# Patient Record
Sex: MALE | Race: WHITE
[De-identification: names, ages, dates, MRNs, and addresses within clinical notes are randomized per-mention and may not be internally consistent; named-entity substitution may affect disease eponyms.]

---

## 2017-05-04 ENCOUNTER — HOSPITAL ENCOUNTER (EMERGENCY)
Dept: HOSPITAL 80 - CED | Age: 22
Discharge: LEFT BEFORE DISCHARGE | End: 2017-05-04
Payer: COMMERCIAL

## 2017-05-04 DIAGNOSIS — Z53.21: Primary | ICD-10-CM

## 2018-11-08 ENCOUNTER — HOSPITAL ENCOUNTER (INPATIENT)
Dept: HOSPITAL 80 - FED | Age: 23
LOS: 5 days | Discharge: HOME | DRG: 882 | End: 2018-11-13
Attending: PSYCHIATRY & NEUROLOGY | Admitting: PSYCHIATRY & NEUROLOGY
Payer: MEDICAID

## 2018-11-08 DIAGNOSIS — F43.25: Primary | ICD-10-CM

## 2018-11-08 DIAGNOSIS — F84.0: ICD-10-CM

## 2018-11-08 LAB — PLATELET # BLD: 209 10^3/UL (ref 150–400)

## 2018-11-08 PROCEDURE — G0480 DRUG TEST DEF 1-7 CLASSES: HCPCS

## 2018-11-08 NOTE — ASMTTLCEVL
[facility] Evaluation - Basic Information

 

Evaluation Start Date and     11/08/2018 06:30 PM

Time                          

Hospital Status               Answers:  M1 Hold                               

Patient statement             

Notes:

I got kicked out of a coffee shop.  Edel gotten verbally aggressive

Narrative                     

Notes:

Pt is a 23 year old  male who was brought to [facility] Ed after he got into an argument at a 

coffee shop and became verbally aggressive and agitated. Pts psychiatrist recommended pt come in to 


the emergency department for an evaluation. Per parents pt has increased agitation and insistence 

around going to this coffee shop even though he is no longer wanted there due to his aggressive 

behaviors. Today, pt became verbally aggressive to another customer and he vomited in and bathroom 

was kicked out of the coffee shop and pt became agitated. Pt stated,  I get angry when I feel Edel 

been wronged.This writer spoke with pt.s psychiatrist Dr. Mack. Dr. Mack stated, Pts mother 

called him and stated People  him to be unsafe, and think he is dangerous and cannot contain 

him.  Pt stated he was having SI yesterday he felt suicidal but no plan. Pt denies SI 

currently. Mom stated she does not feel safe taking pt home and stated, I know he will try and 

leave and go back to the coffee shop and because he is autistic, there will be a violent 

altercation.  Mom would like pt to be hospitalized.

Diagnosis History             

Notes:

Pt has a hx of high functioning autism. Parents state his psychiatrist believe pt is showing signs 

of paranoia and has a mood disorder. 

Prior suicide attempts        

Notes:

Pt denied any prior suicide attempts but stated he has had SI yesterday but no plan.

Prior hospitalizations        

Notes:

None reported

Treatment Responses           

Notes:

N/A

History of violence           

Notes:

Pt denied but father reported that pt told him just a few minutes prior to this evaluation that he 

wants to burn down the coffee house.  Pt admitted that he did say that but that he does not mean it 


and if that even if I did do it, I would not do it when people were inside, but Im not going to do 

that.

Therapist:                    Dr. Vivar

Psychiatrist:                 Dr. Mack

Medications                   

(name, dosage, route, freq    

uency)                        

Notes:

Abilify 20mg; Melatonin; Paroxetine 40mg

Benzodiazapene (Unsure what benzo)

Allergies/Reaction            

Notes:

Nka

Sleep                         

Notes:

Wnl

Appetite                      

Notes:

Wnl

Medical/Surgical history      

Notes:

Pt reports recent vomiting and fainting. He has an appt tomorrow with his pcp for a check up.

Substance use history         

(frequency, intensity, his    

tory, duration)               

Notes:

Pt denied any drug use and states he drinks socially. Utox positive for benzodiazepines and bal was 


.0.

Family composition            

Notes:

Pt has 1 older half-brother.

Need for family               Answers:  No                                    

participation in                                                              

patient's care                                                                

Family                        

psychiatric/substance         

abuse history                 

Notes:

Pts maternal grandmother was schizophrenic, mother has depression. Father has depression and pt.s 

paternal aunt was schizophrenic.

Developmental history         

Notes:

Pt was dx with autism at age 23. Pt reports he was bullied all through elementary, middle school 

and high school. Pt denied any childhood abuse. Pt denied any concussions. 

Abuse concerns                Answers:  None                                  

Marital status/children       

Notes:

Unmarried, no children.

Living situation              

Notes:

Pt Lives in [place] with his roommate in coffee shop. 

Sexual                        

history/orientation           

Notes:

Heterosexual

Peer support/family           

strengths                     

Notes:

Pt stated he has a good support system.

Education level/history       

Notes:

Pt completed some college.

Work history                  

Notes:

Pt stated he works at [employer].

                      

Notes:

None reported.

Legal                         

Notes:

Pt stated road rage and followed up and yelled at her and  gave him a court date.  There was 

another incident where pt liked a  at a coffee shop but she felt uncomfortable with his 

advances and the manager filed a restraining order against him and he will not be allowed to go 

back. 

Anglican/Spiritual           

Notes:

None that would interfere with tx.

Leisure                       

Notes:

Pt enjoys hikers and bikes. 

Collateral                    

Notes:

Parents

Dr. Mack

Patient's strengths           Answers:  Honest                                

(Please select at least                                                       

TWO strengths):                                                               

                                        Intelligent                           

                                        Willingness                           

TLC Evaluation - Mental Status Exam

 

Appearance:                   Answers:  Appropriate                           

Eye Contact:                  Answers:  Intermittent                          

Mood:                         Answers:  Irritable                             

                                        Sad                                   

Affect:                       Answers:  Calm                                  

                                        Guarded                               

Behavior:                     Answers:  Cooperative                           

                                        Resistive to Care                     

Speech:                       Answers:  Relevant                              

                                        Logical                               

                                        Clear                                 

                                        Coherent                              

Thought Process:              Answers:  Organized                             

                                        Oriented                              

                                        Alert                                 

Insight:                      Answers:  Fair                                  

Judgement:                    Answers:  Poor                                  

Manic Signs/Symptoms          Answers:  Mood Swings                           

Hallucinations:               Answers:  None                                  

Delusions:                    Answers:  Paranoid Ideation                     

Pt reported to have           Answers:  No                                    

suicidal/self-injuring                                                        

ideation/behavior?                                                            

Pt reported to be making      Answers:  Yes                                   

suicidal/self-injuring                                                        

threats?                                                                      

Pt reported to have           Answers:  No                                    

aggression/assault                                                            

ideation/behavior?                                                            

Pt reported to be making      Answers:  Yes                                   

aggression/assault                                                            

threats?                                                                      

Pt exhibits inability to      Answers:  No                                    

care for self/grave                                                           

disability?                                                                   

Ideation/behavior is          Answers:  Yes                                   

chronic?                                                                      

Patient has a specific        Answers:  No                                    

plan?                                                                         

History of                    Answers:  No                                    

suicidal/self-injuring                                                        

ideation, behavior, or                                                        

threats?                                                                      

History of                    Answers:  Yes                                   

aggressive/assaultive                                                         

ideation, behavior, or                                                        

threats?                                                                      

History of serious            Answers:  No                                    

physical harm to                                                              

self/others while in                                                          

treatment setting?                                                            

T[facility] Evaluation - Suicide/Homicide Risk

 

Suicide Risk Factors:         Answers:  < 20 or > 40 Years of Age             

                                        Agitation                             

                                        Rapid Mood Shifts                     

Homicide/violence risk        Answers:  Threats Towards Others                

factors:                                                                      

Current Suicidal              Answers:  No                                    

Ideation?                                                                     

Current Suicidal Ideation     Answers:  Yes                                   

in the Past 48 Hours?                                                         

Current Suicidal              Answers:  No                                    

Ideation, Worst Ever?                                                         

Suicide Internal              Answers:  Absence of Psychosis                  

Protective Factors:                                                           

Suicide External              Answers:  Positive Therapeutic                  

Protective Factors:                     Relationships                         

                                        Social Support                        

Ranking of patient's          Answers:  Moderate                              

suicidal risk:                                                                

Ranking of patient's          Answers:  Moderate                              

homicidal risk:                                                               

TLC Evaluation - Wrap-up

 

AXIS I Diagnosis (include     

DSM-V and ICD-10              

codes), must also be          

entered in                    

[facility], which is the        

source of truth.              

Notes:

Unspecified Schizophrenia Spectrum and Other Psychotic Disorder  298.9  (F29)

 Autism Spectrum Disorder  299.00 (F84.0)

 In consultation with [facility] ED physician, Last Mcgowan MD and on-call psychiatrist, Radha Larios MD, both concurred that pt appears to meet 27-65 criteria requiring psychiatric 

hospitalization as pt appears to be at risk of harm to self/others due to a mental illness 

condition

Evaluation End Date and       11/08/2018 10:10 PM

Time (HH:MOISES):                 

 

Date Signed:  11/08/2018 10:11 PM

Electronically Signed By:Lisa Cid

## 2018-11-08 NOTE — EDPHY
H & P


Smoking Status: Never smoked


Time Seen by Provider: 11/08/18 14:06


HPI/ROS: 





CHIEF COMPLAINT:  Suicidal ideation, increasing agitation





HISTORY OF PRESENT ILLNESS:  Patient had an argument in a coffee shop today 

with another person.  He has a history of autism and depression.  He was making 

suicidal statements to his parents and was increasingly agitated since this 

encounter and his mental health provider recommended to come to the emergency 

department for evaluation.


Here the patient says he has some suicidal thoughts but no plan.  Denies 

homicidal ideation or hallucinations.





REVIEW OF SYSTEMS:


Eye: no change in vision


ENT: no sore throat


Cardiac: no chest pain or syncope


Pulmonary: no cough or SOB


Abdomen:  Has some nausea and recent vomiting does not have abdominal pain.  No 

diarrhea.


Musculoskeletal: no back pain


Skin: no rash


Neuro: no headache


Constitutional: no fever


: no urinary symptoms





A comprehensive 10 point review of systems is otherwise negative aside from 

elements mentioned in the history of present illness.





PAST MEDICAL HISTORY:  Autism and depression





Social history:  Nonsmoker, no alcohol





General Appearance: Alert and conversant, cooperative.


Eyes: No scleral  icterus. 


ENT, Mouth: Normal mucous membranes.


Respiratory: Normal respiratory effort, breath sounds equal, lungs are clear to 

auscultation.


Cardiovascular:  Regular rate and rhythm.


Gastrointestinal:  Abdomen is soft and non tender.  No rebound or guarding.  No 

McBurney's point tenderness.


Neurological: Alert, face symmetric, normal motor and sensory in extremities. 


Skin: Warm and dry, no rashes.


Musculoskeletal: No peripheral edema.


Psychiatric: Not agitated.  Otherwise as in HPI.





Emergency Department course/MDM:





Labs and urine tox, psychiatric evaluation.


Signed out to [facility] at 1500 with psychiatric evaluation pending. (Charlie Schulte)


Constitutional: 


 Initial Vital Signs











Temperature (C)  37.5 C   11/08/18 14:05


 


Heart Rate  97   11/08/18 14:05


 


Respiratory Rate  16   11/08/18 14:05


 


Blood Pressure  134/82 H  11/08/18 14:05


 


O2 Sat (%)  95   11/08/18 14:05








 











O2 Delivery Mode               Room Air














Allergies/Adverse Reactions: 


 





No Known Allergies Allergy (Unverified 11/08/18 14:03)


 








Home Medications: 














 Medication  Instructions  Recorded


 


ARIPiprazole [Abilify 5 mg (*)] 5 mg PO HS 11/08/18


 


ARIPiprazole [Abilify] 15 mg PO HS 11/08/18


 


Melatonin [Melatonin 5 mg] 5 mg PO HS PRN 11/08/18


 


PARoxetine HCL [Paroxetine HCl] 40 mg PO HS 11/08/18














Medical Decision Making


ED Course/Re-evaluation: 





2133:  Patient been accepted at [address] [place] [address] by Dr. Larios.


EMTALA FILLED OUT>


APPROPRIATE TRANSFER WILL BE SET UP. (Last Joshi)





- Data Points


Laboratory Results: 


 Laboratory Results





 11/08/18 14:40 





 11/08/18 14:40 








Medications Given: 


 





Aripiprazole (Abilify)  20 mg PO HS ROMELIA


   Stop: 05/07/19 23:29


   Last Admin: 11/08/18 23:33 Dose:  20 mg


Lorazepam (Ativan)  0.5 - 1 mg PO Q4HRS PRN


   PRN Reason: Anxiety, Able to Take PO


   Stop: 05/07/19 22:59


   Last Admin: 11/08/18 23:33 Dose:  1 mg


Paroxetine HCl (Paxil)  40 mg PO HS ROMELIA


   Stop: 05/07/19 23:29


   Last Admin: 11/08/18 23:33 Dose:  40 mg





Discontinued Medications





Lorazepam (Ativan)  1 mg PO ONCE ONE


   Stop: 11/08/18 21:33


   Last Admin: 11/08/18 21:35 Dose:  1 mg








Departure





- Departure


Disposition: [facility] IP


Clinical Impression: 


 Severe major depression





Condition: Good

## 2018-11-08 NOTE — ASMTTCLDSP
TLC Discharge Disposition

 

Disposition:                  Answers:  Admit                                 

Discharge                     

Concerns/Recommendations:     

Notes:

In consultation with [facility] ED physician, Last Mcgowan MD and on-call psychiatrist, Radha Larios MD, both concurred that pt appears to meet 27-65 criteria requiring psychiatric 

hospitalization as pt appears to be at risk of harm to self/others due to a mental illness 

condition. Pt was given the [facility] prohibited belongings list while in the ED.

Was patient given the         Answers:  Yes                                   

Inpatient Behavioral                                                          

Health Prohibited                                                             

Belongings List while in                                                      

the ED?                                                                       

For inpatient                 Radha Larios MD

admission, the following      

psychiatrist agreed to        

accept patient for            

admission to Behavioral       

Health ([facility]):              

 

Date Signed:  11/08/2018 10:13 PM

Electronically Signed By:Lisa Cid
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
## 2018-11-09 NOTE — PDMN
Medical Necessity


Medical necessity: Pt meets inpt criteria per MD order and [facility] B-012, Autism 

Spectrum Disorders, Adult: Inpatient Care. 24 y/o admitted w/autism spectrum 

disorder and adjustment disorder w/mixed disturbance of emotions and conduct, 

on M1 hold due to risk of harm to self and others, requiring inpt psychiatric 

hospitalization.
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
## 2018-11-09 NOTE — PDGENHP
History and Physical





- Chief Complaint


medical evaluation 





- History of Present Illness


22 [facility] Has depression, autism spectrum disorder 

and per report increasing depression and angry outbursts, concerns about 

suicidal thoughts.


He denied recent illness, cough, congestion, v/d, abdominal pain. Does have 

frequent heartburn symptoms with nausea.  Also has occ frontal headaches, no 

history of migraines. 





History Information





- Allergies/Home Medication List


Allergies/Adverse Reactions: 








No Known Allergies Allergy (Unverified 11/08/18 14:03)


 





Home Medications: 








ARIPiprazole [Abilify 5 mg (*)] 5 mg PO HS 11/08/18 [Last Taken 11/07/18]


ARIPiprazole [Abilify] 15 mg PO HS 11/08/18 [Last Taken 11/07/18]


Melatonin [Melatonin 5 mg] 5 mg PO HS PRN 11/08/18 [Last Taken 11/07/18]


PARoxetine HCL [Paroxetine HCl] 40 mg PO HS 11/08/18 [Last Taken 11/07/18]





I have personally reviewed and updated: medical history, social history, 

surgical history


Past Medical History: depressin, autism spectrum/[place] (Dr Noel Mack, Dr Johnson, PCP Dr SADAF Regalado)





- Past Medical History


Additional medical history: no DM/HTN/asthma





- Surgical History


Reports: no pertinent surgical hx





- Family History


Positive for: non-pertinent





- Social History


Smoking Status: Never smoked


Alcohol Use: Rarely (2-3 beers a week)


Drug Use: None


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 

family in area





Review of Systems


Review of Systems: 








Physical Exam


Physical Exam: 

















Temp Pulse Resp BP Pulse Ox


 


 98.1 F   90   16   119/77   96 


 


 11/09/18 09:40  11/09/18 09:40  11/09/18 09:40  11/09/18 09:40  11/09/18 09:40











Constitutional: no apparent distress


Eyes: anicteric sclera


Ears, Nose, Mouth, Throat: moist mucous membranes, hearing normal


Cardiovascular: regular rate and rhythym, no murmur, rub, or gallop


Respiratory: no respiratory distress, no rales or rhonchi, clear to auscultation


Gastrointestinal: normoactive bowel sounds, soft, non-tender abdomen


Skin: warm, normal color


Psychiatric: depressed, flat affect


Lymph, Heme, Immunologic: no cervical LAD





Lab Data & Imaging Review





 11/08/18 14:40





 11/08/18 14:40














WBC  6.65 10^3/uL (3.80-9.50)   11/08/18  14:40    


 


RBC  5.32 10^6/uL (4.40-6.38)   11/08/18  14:40    


 


Hgb  15.6 g/dL (13.7-17.5)   11/08/18  14:40    


 


Hct  45.0 % (40.0-51.0)   11/08/18  14:40    


 


MCV  84.6 fL (81.5-99.8)   11/08/18  14:40    


 


MCH  29.3 pg (27.9-34.1)   11/08/18  14:40    


 


MCHC  34.7 g/dL (32.4-36.7)   11/08/18  14:40    


 


RDW  12.3 % (11.5-15.2)   11/08/18  14:40    


 


Plt Count  209 10^3/uL (150-400)   11/08/18  14:40    


 


MPV  10.3 fL (8.7-11.7)   11/08/18  14:40    


 


Neut % (Auto)  70.2 % (39.3-74.2)   11/08/18  14:40    


 


Lymph % (Auto)  21.5 % (15.0-45.0)   11/08/18  14:40    


 


Mono % (Auto)  6.6 % (4.5-13.0)   11/08/18  14:40    


 


Eos % (Auto)  0.8 % (0.6-7.6)   11/08/18  14:40    


 


Baso % (Auto)  0.6 % (0.3-1.7)   11/08/18  14:40    


 


Nucleat RBC Rel Count  0.0 % (0.0-0.2)   11/08/18  14:40    


 


Absolute Neuts (auto)  4.67 10^3/uL (1.70-6.50)   11/08/18  14:40    


 


Absolute Lymphs (auto)  1.43 10^3/uL (1.00-3.00)   11/08/18  14:40    


 


Absolute Monos (auto)  0.44 10^3/uL (0.30-0.80)   11/08/18  14:40    


 


Absolute Eos (auto)  0.05 10^3/uL (0.03-0.40)   11/08/18  14:40    


 


Absolute Basos (auto)  0.04 10^3/uL (0.02-0.10)   11/08/18  14:40    


 


Absolute Nucleated RBC  0.00 10^3/uL (0-0.01)   11/08/18  14:40    


 


Immature Gran %  0.3 % (0.0-1.1)   11/08/18  14:40    


 


Immature Gran #  0.02 10^3/uL (0.00-0.10)   11/08/18  14:40    


 


Sodium  140 mEq/L (135-145)   11/08/18  14:40    


 


Potassium  4.0 mEq/L (3.3-5.0)   11/08/18  14:40    


 


Chloride  105 mEq/L ()   11/08/18  14:40    


 


Carbon Dioxide  25 mEq/l (22-31)   11/08/18  14:40    


 


Anion Gap  10 mEq/L (6-14)   11/08/18  14:40    


 


BUN  14 mg/dL (7-23)   11/08/18  14:40    


 


Creatinine  0.8 mg/dL (0.7-1.3)   11/08/18  14:40    


 


Estimated GFR  > 60   11/08/18  14:40    


 


Glucose  104 mg/dL ()  H  11/08/18  14:40    


 


Hemoglobin A1c  5.7 % (4.0-6.0)   11/08/18  14:40    


 


Estim Average Glucose  117 mg/dL ()   11/08/18  14:40    


 


Calcium  10.0 mg/dL (8.5-10.4)   11/08/18  14:40    


 


Total Bilirubin  0.8 mg/dL (0.1-1.4)   11/08/18  14:40    


 


Conjugated Bilirubin  0.2 mg/dL (0.0-0.5)   11/08/18  14:40    


 


Unconjugated Bilirubin  0.6 mg/dL (0.0-1.1)   11/08/18  14:40    


 


AST  27 IU/L (17-59)   11/08/18  14:40    


 


ALT  32 IU/L (21-72)   11/08/18  14:40    


 


Alkaline Phosphatase  79 IU/L ()   11/08/18  14:40    


 


Total Protein  7.6 g/dL (6.3-8.2)   11/08/18  14:40    


 


Albumin  4.8 g/dL (3.5-5.0)   11/08/18  14:40    


 


Triglycerides  275 mg/dL ()  H  11/08/18  14:40    


 


Cholesterol  203 mg/dL (140-200)  H  11/08/18  14:40    


 


Cholesterol Risk Factr  1.0  (0.2-1.0)   11/08/18  14:40    


 


LDL Cholesterol, Calc  105 mg/dL ()  H  11/08/18  14:40    


 


LDL Risk Factor  0.8  (0.2-1.0)   11/08/18  14:40    


 


VLDL Cholesterol  55 mg/dL (8-25)  H  11/08/18  14:40    


 


Non-HDL Cholesterol  160 mg/dL ()  H  11/08/18  14:40    


 


HDL Cholesterol  43 mg/dL (40-70)   11/08/18  14:40    


 


LDL/HDL Ratio  2.44 RATIO (1.00-3.64)   11/08/18  14:40    


 


Cholesterol/HDL Ratio  4.72 RATIO (1.00-4.97)   11/08/18  14:40    


 


Salicylates  < 1.0 mg/dL (2.0-20.0)  L  11/08/18  14:40    


 


Urine Opiates Screen  NEGATIVE  (NEGATIVE)   11/08/18  16:22    


 


Acetaminophen  < 10 mcg/mL (10-30)  L  11/08/18  14:40    


 


Urine Barbiturates  NEGATIVE  (NEGATIVE)   11/08/18  16:22    


 


Ur Phencyclidine Scrn  NEGATIVE  (NEGATIVE)   11/08/18  16:22    


 


Ur Amphetamine Screen  NEGATIVE  (NEGATIVE)   11/08/18  16:22    


 


U Benzodiazepines Scrn  NON-NEGATIVE  (NEGATIVE)  H  11/08/18  16:22    


 


Urine Cocaine Screen  NEGATIVE  (NEGATIVE)   11/08/18  16:22    


 


U Marijuana (THC) Screen  NEGATIVE  (NEGATIVE)   11/08/18  16:22    


 


Ethyl Alcohol  < 10 mg/dL (0-10)   11/08/18  14:40    











Assessment & Plan


Assessment: 








Adjustment disorder with mixed disturbance of emotions and conduct (Acute)


Autism spectrum disorder (Chronic)


   -per  team





Heartburn/nausea


   -ordered pepcid daily and zofran to use as needed





Headache


   -prns ordered





Thank you for asking hospitalist to participate in his care and please contact 

us if further medical needs arise.

## 2018-11-10 RX ADMIN — FAMOTIDINE SCH MG: 20 TABLET, FILM COATED ORAL at 08:53
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
## 2018-11-11 RX ADMIN — FAMOTIDINE SCH MG: 20 TABLET, FILM COATED ORAL at 09:10

## 2018-11-11 NOTE — ASMTBHDC
Notes

 

Note:                         

Notes:

Pt. reports feeling "pretty good. Nice and cozy in here". Pt. reports sleeping "really 

well". Pt. stated he is "absolutly" getting enough to eat. Pt. reports no issues with his current 

medications. Pt. stated his therapist is Dr. Johnson, in [place] Pt. stated he is attending 

groups, and likes art group. Pt. stated "found everyone here to be very nice". Pt. shared he had 

several visitors yesterday. Pt. requested to shave. Pt. stated he hopes to discharge on Monday and 

then take Tuesday off from work. When asked if he could stay away from the coffee shop near his 

home, pt stated "try but don't know if I could do that". Pt. stated he doesn't have a 

car. Pt. stated he likes coffee shops. Pt. denied SI, HI, AVH, and paranoia. 



Pt. presents as alert, good eye contact, fidgety cooperative, and friendly. Staff report 

pt. sleeping 8 hours and being medication compliant. 

 

Date Signed:  11/11/2018 03:04 PM

Electronically Signed By:Tomasa Hernandez
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
## 2018-11-12 RX ADMIN — FAMOTIDINE SCH MG: 20 TABLET, FILM COATED ORAL at 08:05

## 2018-11-12 NOTE — ASMTBHDC
Notes

 

Note:                         

Notes:

Pt. reports feeling "pretty good". Pt. stated he slept "good". Pt. stated he is "very mad at coffee 


shop". Pt. stated he is upset they threw him out for getting sick. Pt. stated "no one's helping me 

convince the coffee shop lopez..." Pt. stated if he cannot go home, he could rent a hotel for a 

couple of nights, adding he has savings. Pt. stated he has over a $100 in savings. Pt. denied 

SI, HI, AVH and paranoia. 



Pt. presents as alert, slightly agitated, fair eye contact, fidgeting, and distracted. Staff report 


pt. sleeping 7.5 hours and being medication compliant. 

 

Date Signed:  11/12/2018 01:55 PM

Electronically Signed By:Tomasa Hernandez

## 2018-11-12 NOTE — SOAPPROG
SOAP Progress Note


Assessment/Plan: 


Assessment:





Major Depressive Disorder, Severe.  Autism Spectrum Disorder.  Improvement 

noted. (see subjective/objective note).  Patient requires continued inpatient 

hospitalization due to recent suicidal ideation.  Patient could benefit from 

continued inpatient hospitalization for crisis stabilization, safety, and 

medication evaluation.  Patient likely reaching baseline and discharge tomorrow 

if stable with safe discharge plan.  








Plan:





1. Psychotropic medications: After reviewing options, risks, and benefits 

patient agrees to continue current medications.  No other medication changes at 

this time as more time is needed to determine ongoing tolerability and 

efficacy.  Plan is to continue to observe patient for response and side effects 

from medications, and ongoing monitoring and evaluation.  


2. Review with patient informed consent and recommendations for psychotropic 

medication treatment listed below


3. Labs: no additional labs at this time


4. Therapy: continue milieu and group therapy  


5. Further investigation including gathering information from patients 

relatives and review of past case records to inform treatment plan.


6. Safety/Wellness plan and follow-up outpatient appointments to be established 

prior to discharge.  Next steps are for patient to meet with care manager to 

plan a safe discharge plan and establish outpatient services for ongoing 

treatment.  


7. Confer with inpatient treatment team regarding treatment plan.  


8. Psychosocial stressors addressed through care coordinator 


9. Legal status: voluntary 


10. Consider discharge on Tuesday if patient is in stable condition, safe, and 

has a safe discharge plan.   








PSYCHOTROPIC MEDICATION TREATMENT INFORMED CONSENT and RECOMMENDATIONS: Review 

nature of condition, diagnosis, and prognosis.  Review nature and purpose of 

psychotropic medication treatment.  Review type of psychotropic medications 

being ordered.  Review risk and benefits of psychotropic medication treatment.  

Review probable length of time patient will need to take medications.  Review 

risk and benefits of not undergoing psychotropic medication treatment.  Review 

alternative treatments to psychotropic medications.  Review psychotropic 

medications contraindications, drug-drug interactions, side effects, and 

importance of reporting any side effects to a psychiatric provider or nurse 

during inpatient hospitalization, and upon discharge to patients psychiatric 

outpatient provider, primary care provider, or other health care professional.  

Review importance of asking a nurse, psychiatric provider, or primary care 

provider any questions or problems concerning the psychotropic medications.  

Verify patient understands the information that has been provided, and 

understands, accepts, and agrees to psychotropic medications.  





Review patients safety plan and importance of patient to report to staff while 

hospitalized if patient is ever a danger to self/others, or unable to care for 

self, and upon discharge, the importance for patient to contact [facility] or 911, or go to the nearest emergency room, if patient is ever a 

danger to self/others, or unable to care for self.  Recommend that upon 

discharge patient establish medication management treatment with a psychiatric 

provider, establishes routine therapy appointments, and follow-up with primary 

care provider.  Verify patient understands and agrees to these recommendations.





11/12/18 08:45





Subjective: 


Following up with patient for evaluation of depression and safety.  Patient 

reports, "Doing good.  Going okay."  Patient expresses the following 

psychiatric symptoms none.  Patient denies SI/HI, and reports no SI since prior 

to his admission.  Patient denies HI prior to admission.  Patient reports 

taking medications as prescribed, and describes response to medications as 

good.  Patient does not report undesirable side effects from the medications, 

and agrees to continue current medications.  Patient describes getting 8 hours 

of sleep.  





Objective: 





 Vital Signs











Temp Pulse Resp BP Pulse Ox


 


 36.5 C   64   20   120/59 L  94 


 


 11/12/18 06:00  11/12/18 06:00  11/12/18 06:00  11/12/18 06:00  11/12/18 06:00








NURSING REPORT: Consulted with nursing for update on patients progress in 

treatment.  Nurses report patient is engaged in treatment, is attending groups, 

slept 8 hours, expresses the following psychiatric symptoms: moderate anxiety; 

exhibits the following psychiatric symptoms: anxiety; is eating all meals, is 

agreeable to medications and taking as prescribed with no report of side effects

, with no s/s of EPS/akathisia, and denies SI/HI, denies A/V hallucinations, 

and denies delusions.





MD REPORT FROM WEEKEND: Patient is doing well, no complaints, no SI.





MSE: The patient presents casually dressed and with good hygiene, and looks 

stated age.  Patient is sitting, posture is upright, and position is relaxed.  

Patient appears awake, alert, and responds appropriately and reasonably during 

interview.  Patient is engaged, relates well to interviewer, and emotional 

facial expression is appropriate to situation and changes appropriately with 

topic.  Patient is cooperative, makes comfortable eye contact, and movements 

are voluntary, deliberate, coordinated, and smooth and even with no 

inappropriate movements.  Patient makes laryngeal sounds effortlessly and 

shares conversation appropriately; pace of conversation is appropriate, and 

stream of talking is fluent; articulation is clear and understandable; word 

choice is effortless and appropriate for education level; completes sentences, 

occasionally pausing to think; rate and volume are appropriate for interview 

and setting.  Patient reports mood as anxious.  Patients affect is anxious, 

congruent with mood, and appropriate to speech and circumstances.  Patient has 

linear and logical thinking, with no loose associations, tangential thought, 

thought blocking, concrete thinking, or any other signs of formal thought 

disorder.  Patient denies suicidal and homicidal ideation, and denies 

hallucinations and delusions.  Patient appears to be a reliable historian with 

sound judgement and good insight into current condition.  Patient has no 

apparent dysfunction in recent or remote memory noted, and no evidence of gross 

cognitive dysfunction noted at any point during the interview.











- Time Spent With Patient


Time Spent With Patient: 


15 minutes, met with patient individually.








- Pending Discharge


Pending Discharge Within 24 Hours: No


Pending Discharge Within 48 Hours: No





ICD10 Worksheet


Patient Problems: 


 Problems











Problem Status Onset


 


Adjustment disorder with mixed disturbance of emotions and conduct Acute  


 


Autism spectrum disorder Chronic
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
## 2018-11-13 VITALS — DIASTOLIC BLOOD PRESSURE: 58 MMHG | SYSTOLIC BLOOD PRESSURE: 119 MMHG

## 2018-11-13 RX ADMIN — FAMOTIDINE SCH MG: 20 TABLET, FILM COATED ORAL at 08:58

## 2018-11-13 NOTE — BDS
REASON FOR ADMISSION:  From the ED note dated 11/08/2018, the patient had an 
argument at a coffee shop prior to presenting to the emergency department.  
Patient has a history of autism and depression.  Patient was making suicidal 
statements to his parents and was increasingly agitated since the encounter in 
the coffee shop, and his mental health provider recommended he go to the 
emergency department for evaluation.  Patient reported suicidal thoughts with 
no plan.  Patient denied homicidal ideation or hallucinations.  Patient was 
admitted involuntarily on an M1 hold due to being a danger to himself.  Patient 
was admitted for safety, crisis stabilization, and medication evaluation.



ADMITTING DIAGNOSES:  

1.  Adjustment disorder with mixed disturbance of emotions and conduct.  

2.  Autism spectrum disorder admission.



ADMISSION PHYSICAL EXAM:  Patient was seen for history and physical on 11/09/
2018, for medical clearance for inpatient psychiatric hospitalization.  Patient 
was medically cleared for inpatient psychiatric hospitalization and treatment.  
For further details, please refer to history and physical dated 11/09/2018.



ADMISSION LABORATORY DATA:  CBC from 11/08/2018, within normal limits.  BMP 
from 11/08/2018, within normal limits, except glucose is elevated at 104.  
Hemoglobin A1c from 11/08/2018, was 5.7 and within normal limits.  Liver 
function from 11/08/2018, within normal limits.  Lipid panel from 11/08/2018, 
within normal limits, except triglycerides were elevated at 175, cholesterol 
was elevated at 203, LDL cholesterol calculated was elevated at 105, VLDL 
cholesterol was elevated at 55, non-HDL cholesterol was elevated at 160.  
Toxicology screen from 11/08/2018, was non-negative for benzodiazepines, 
negative for all other substances screened, and negative for ethyl alcohol.



MAJOR PROCEDURES/TESTS:  None.



HOSPITAL COURSE:  The most prominent symptoms and behaviors while the patient 
was here were moderate anxiety and depression.  Treatment modalities utilized 
were milieu and group therapy.  Patient's outpatient medications were continued
, including Paxil 40 mg p.o. q.h.s. and Abilify 20 mg p.o. q.h.s.  these 
medications were tolerated with no report of side effects and with good 
response.  Patient has improved considerably with no signs of psychiatric 
symptoms and no psychiatric symptoms expressed at time of discharge.  Patient 
reports he has improved since admission, states to be in stable condition, 
feels safe to discharge, and he contracts for safety.  Patient's response to 
treatment was good.  There were no adverse or unexpected results of treatment.  
The patient was safe throughout his stay, active in treatment, attended and 
engaged in groups, and was appropriate with staff.  Patient met with the 
treatment team prior to discharge to assess readiness to discharge and review 
discharge plan.  The treatment team consensus is the patient is in stable 
condition, has a safe discharge plan, and is ready to discharge today.



CONDITION AT DISCHARGE:  Patient is in stable condition and is no longer a 
danger to self or others, and is not gravely disabled due to mental illness.  
Patient is no longer in need of inpatient level of care, and can be safely and 
effectively treated within the community.  The patients level of risk at time 
of discharge is low.  MSE: The patient is casually dressed and with good hygiene
, and looks stated age.  Patient is sitting, posture is upright, and position 
is relaxed.  Patient appears awake, alert, and responds appropriately and 
reasonably during interview.  Patient is engaged, relates well to interviewer, 
and emotional facial expression is appropriate to situation and changes 
appropriately with topic.  Patient is cooperative, makes comfortable eye contact
, and movements are voluntary, deliberate, coordinated, and smooth and even 
with no inappropriate movements.  Patient makes laryngeal sounds effortlessly 
and shares conversation appropriately; pace of conversation is appropriate, and 
stream of talking is fluent; articulation is clear and understandable; word 
choice is effortless and appropriate for education level; completes sentences, 
occasionally pausing to think; rate and volume are appropriate for interview 
and setting.  Patient reports mood as euthymic.  Patients affect is stable with 
full variable range, congruent with mood, and appropriate to speech and 
circumstances.  Patient has linear and logical thinking, with no loose 
associations, tangential thought, thought blocking, concrete thinking, or any 
other signs of formal thought disorder.  Patient denies suicidal and homicidal 
ideation, and denies hallucinations and delusions.  Patient appears to be a 
reliable historian with sound judgement and good insight into current 
condition.  Patient has no apparent dysfunction in recent or remote memory noted
, and no evidence of gross cognitive dysfunction noted at any point during the 
interview.



DISCHARGE DIAGNOSES:  

1.  Adjustment disorder with mixed disturbance of emotions and conduct.  

2.  Autism spectrum disorder admission.



CURRENT MEDICATIONS:  After reviewing options risks and benefits with the 
patient, patient agrees to continue:

1.  Abilify 20 mg p.o. q.h.s. 

2.  Paroxetine 40 mg p.o. q.h.s.  



Patient reports he has prescriptions for these medications and does not require 
any prescriptions at time of discharge.  The medications and dosage and dosages 
are reviewed with the patient at time of discharge to ensure accuracy and 
patient understanding.



DISPOSITION:  Patient left hospital independently and voluntarily with his 
mother and father after meeting with this NP and care coordinator for a family 
meeting.  Patient plans to return home with his parents.



FOLLOWUP:  Care coordinator reports the appropriate outpatient follow-up 
services have been established and outpatient appointments have been scheduled.
  The patient received written instructions with times and dates of outpatient 
follow-up appointments.  The following follow-up recommendations were provided 
to the patient at discharge: Continue psychotropic medications as prescribed 
and attend appointments as scheduled.  Report any side effects to a psychiatric 
outpatient provider, a primary care provider, or other health care 
professional.  Address any questions or problems concerning the psychotropic 
medications with a psychiatric outpatient provider, a primary care provider, or 
other health care professional.  Contact [facility] or [facility], or go 
to the nearest emergency room, if you are ever a danger to yourself/others, or 
unable to care for yourself.  As soon as possible, establish a routine 
medication management treatment with a psychiatric provider, establish routine 
therapy appointments, and follow-up with a primary care provider.  



LEGAL COURSE:  Patient was admitted on an M1 hold for involuntary inpatient 
psychiatric hospitalization.  Patient discharged today independently and 
voluntarily with his parents.



ATTITUDE AT TIME OF DISCHARGE:  Patient reports, "I go a lot of good 
information from the groups here that I can use when I have intrusive thoughts.
  I learned the most from the groups."  The patients attitude was positive at 
time of discharge, and patient reports looking forward to discharging today.  
The patient reports he feels safe to discharge, is no longer a danger to 
himself or others, is in stable condition, and contracts for safety.  Patient 
states he will continue medications as prescribed, and establish medication 
management treatment with an outpatient provider after discharge.  Patient 
reports he understands the information that has been provided to him, and he 
understands, accepts, and agrees to psychotropic medications.  Patient 
describes internal protective factors as the coping skills he has learned while 
hospitalized here, and he plans to continue to practice these coping skills 
after discharge.  Patient reports external protective factors as family.  



FAMILY MEETING: This NP and care coordinator met with patient's parents at time 
of discharge at patient's request to review discharge plan and assess readiness 
to discharge.  Patient's parents are supportive of patient and his ongoing 
treatment.  Patient's parents report patient is safe to discharge and has a 
safe discharge plan.  



LABORATORY/STUDIES:  There were no pending labs or studies at time.



ADVANCED DIRECTIVES:  There were no advance directives on file and patient was 
full code during this hospitalization.



The following psychotropic medication treatment informed consent and 
recommendations were provided to the patient at time of discharge.  Patient 
reports he understands, accepts, and agrees to the information that has been 
provided.   



PSYCHOTROPIC MEDICATION TREATMENT INFORMED CONSENT and RECOMMENDATIONS: Review 
nature of condition, diagnosis, and prognosis.  Review nature and purpose of 
psychotropic medication treatment. Review type of psychotropic medications 
being prescribed.  Review risk and benefits of psychotropic medication 
treatment.  Review probable length of time will need to take medications.  
Review risk and benefits of not undergoing psychotropic medication treatment.  
Review alternative treatments to psychotropic medications.  Review psychotropic 
medications contraindications, side effects, and importance of reporting any 
side effects to a psychiatric provider, primary care provider, or other health 
care professional.  Review importance of asking a psychiatric provider or 
primary care provider any questions or problems concerning the psychotropic 
medications.  



Review safety plan and the importance to contact [facility] or [facility]
, or go to the nearest emergency room, if ever a danger to yourself/others, or 
unable to care for yourself.  Recommend upon discharge to establish routine 
medication management treatment with a psychiatric provider, establish routine 
therapy appointments, and follow-up with a primary care provider.  Verify 
patient understands, accepts, and agrees to the information that has been 
provided. 



Job #:  [id number]

MTDD